# Patient Record
Sex: MALE | Race: BLACK OR AFRICAN AMERICAN | NOT HISPANIC OR LATINO | Employment: UNEMPLOYED | URBAN - METROPOLITAN AREA
[De-identification: names, ages, dates, MRNs, and addresses within clinical notes are randomized per-mention and may not be internally consistent; named-entity substitution may affect disease eponyms.]

---

## 2022-11-23 ENCOUNTER — HOSPITAL ENCOUNTER (EMERGENCY)
Facility: CLINIC | Age: 42
Discharge: HOME | End: 2022-11-23
Attending: NURSE PRACTITIONER | Admitting: NURSE PRACTITIONER
Payer: MEDICAID

## 2022-11-23 ENCOUNTER — APPOINTMENT (OUTPATIENT)
Dept: GENERAL RADIOLOGY | Facility: CLINIC | Age: 42
End: 2022-11-23
Attending: EMERGENCY MEDICINE
Payer: MEDICAID

## 2022-11-23 VITALS
OXYGEN SATURATION: 100 % | DIASTOLIC BLOOD PRESSURE: 104 MMHG | SYSTOLIC BLOOD PRESSURE: 181 MMHG | HEART RATE: 64 BPM | BODY MASS INDEX: 34.82 KG/M2 | TEMPERATURE: 97.5 F | HEIGHT: 65 IN | WEIGHT: 209 LBS | RESPIRATION RATE: 18 BRPM

## 2022-11-23 DIAGNOSIS — I10 HIGH BLOOD PRESSURE: ICD-10-CM

## 2022-11-23 DIAGNOSIS — S82.62XA: ICD-10-CM

## 2022-11-23 PROCEDURE — 27786 TREATMENT OF ANKLE FRACTURE: CPT | Mod: LT

## 2022-11-23 PROCEDURE — 73630 X-RAY EXAM OF FOOT: CPT | Mod: LT

## 2022-11-23 PROCEDURE — 73610 X-RAY EXAM OF ANKLE: CPT | Mod: LT

## 2022-11-23 PROCEDURE — 99284 EMERGENCY DEPT VISIT MOD MDM: CPT | Mod: 25

## 2022-11-23 ASSESSMENT — ENCOUNTER SYMPTOMS
CONFUSION: 0
WOUND: 0
FEVER: 0
JOINT SWELLING: 1
SHORTNESS OF BREATH: 0
ARTHRALGIAS: 1
NAUSEA: 0
NECK PAIN: 0

## 2022-11-23 ASSESSMENT — ACTIVITIES OF DAILY LIVING (ADL): ADLS_ACUITY_SCORE: 33

## 2022-11-23 NOTE — ED TRIAGE NOTES
Monday morning at 0100, pt dropped a large battery on L ankle, pt reports not being able to bear weight since then d/t pain. CMS intact.     Triage Assessment     Row Name 11/23/22 9871       Triage Assessment (Adult)    Airway WDL WDL       Respiratory WDL    Respiratory WDL WDL       Skin Circulation/Temperature WDL    Skin Circulation/Temperature WDL WDL       Cardiac WDL    Cardiac WDL WDL       Peripheral/Neurovascular WDL    Peripheral Neurovascular WDL WDL       Cognitive/Neuro/Behavioral WDL    Cognitive/Neuro/Behavioral WDL WDL

## 2022-11-23 NOTE — LETTER
November 23, 2022      To Whom It May Concern:      Sanjiv Gutierres was seen in our Emergency Department today, 11/23/22.  Sanjiv will need to wear the ankle splint and use crutches and be non-weight bearing until seen for follow-up with orthopedics the week of 11/28/22.    He may return to work when improved.    Sincerely,        Tasha Banda, CNP

## 2022-11-24 NOTE — ED PROVIDER NOTES
ED ATTENDING PHYSICIAN NOTE:   I evaluated this patient in conjunction with Tasha Banda, NIVIA  I have participated in the care of the patient and personally performed key elements of the history, exam, and medical decision making.      HPI:   Sanjiv Gutierres is a 42 year old male left ankle pain.       EXAM:  Physical Exam   General:  Sitting on bed by self, talking on phone with no one at bedside, comfortable appearing.   HENT:  No obvious trauma to head  Right Ear:  External ear normal.   Left Ear:  External ear normal.   Nose:  Nose normal.   Eyes:  Conjunctivae and EOM are normal.  Neck: Normal range of motion. Neck supple. No tracheal deviation present.   Pulm/Chest: No respiratory distress  M/S: Normal range of motion. Mild tenderness to left lateral malleoli with diffuse surrounding swelling, but compartments soft.  No pain to proximal fibula.  DP pulses +2.  Neuro: Alert. GCS 15.  Skin: Skin is warm and dry. No rash noted. Not diaphoretic.   Psych: Normal mood and affect. Behavior is normal.      MEDICAL DECISION MAKING/ASSESSMENT AND PLAN:   Sanjiv Gutierres is a very pleasant 42 year old year old patient who presents to the emergency department with concern of left ankle pain.  X-ray confirms a fracture.  He is placed in a walking boot and given crutches and recommended for nonweightbearing and follow-up with orthopedics.  Compartments are soft.  Please see Ms. Banda's note for additional information.       DIAGNOSIS:     ICD-10-CM    1. Closed fracture of fibula, lateral malleolus, left  S82.62XA     Nondisplaced transverse fracture of the left lateral malleolus (at the level of the talar dome).       2. High blood pressure  I10         DISPOSITION:   Discharged       11/23/2022  [facility] EMERGENCY DEPT     Tye Mcgarry, DO  11/23/22 1926

## 2022-11-24 NOTE — ED PROVIDER NOTES
"  History     Chief Complaint:  Ankle Pain       HPI   Sanjiv Gutierres is a 42 year old male who presents with left ankle pain.  The patient was at work 2 days ago on 11/21/2020 when he battery fell onto the lateral side of his left ankle.  He had acute onset of left lateral ankle pain and has had pain with ambulation since that time.  He has not been evaluated yet.  He denies numbness or weakness of the lower extremity.  He has been taking ibuprofen and Tylenol for pain.  He denies other injury.    ROS:  Review of Systems   Constitutional: Negative for fever.   HENT: Negative.    Respiratory: Negative for shortness of breath.    Cardiovascular: Negative for chest pain.   Gastrointestinal: Negative for nausea.   Musculoskeletal: Positive for arthralgias and joint swelling. Negative for neck pain.   Skin: Negative for wound.   Neurological: Negative for syncope.   Psychiatric/Behavioral: Negative for confusion.   All other systems reviewed and are negative.     Allergies:  No Known Allergies     Medications:    No current outpatient medications on file.      Past Medical History:    No past medical history on file.    Past Surgical History:    No past surgical history on file.     Family History:    family history is not on file.    Social History:  PCP: No primary care provider on file.     Physical Exam     Patient Vitals for the past 24 hrs:   BP Temp Temp src Pulse Resp SpO2 Height Weight   11/23/22 1912 (!) 181/104 -- -- -- -- -- -- --   11/23/22 1705 (!) 190/118 97.5  F (36.4  C) Temporal 64 18 100 % 1.651 m (5' 5\") 94.8 kg (209 lb)        Physical Exam  Nursing notes reviewed. Vitals reviewed.  General: Alert. Moderate discomfort . Well kept.  Eyes: Conjunctiva non-injected, non-icteric.  Neck/Throat: Moist mucous membranes. Normal voice.  Cardiac: Regular rhythm. Normal heart sounds. 2+DP pulse left . Normal capillary refill.  Pulmonary: Clear and equal breath sounds bilaterally.   Musculoskeletal: "   left lower extremity: No foot deformity. No tenderness to palpation over dorsum of foot. Able to flex and extend toes. Tenderness to palpation over bilateral malleolus with swelling. No pain over the proximal tibia/fibula, deltoid ligament, Lis-Franc joint or 5th metatarsal. Full knee flexion and extension intact without difficulty. No knee tenderness to palpation.   Neurological: Alert and oriented x4. 5/5 strength bilateral lower extremity. Distal sensation intact.  Skin: No laceration or ecchymosis to affected extremity.   Psych: Affect normal. Good eye contact.    Emergency Department Course   Imaging:  Foot XR, G/E 3 views, left   Final Result   IMPRESSION:   1.  Nondisplaced transverse fracture of the left lateral malleolus (at the level of the talar dome).   2.  Normal ankle joint spacing and alignment.   3.  Mild hallux valgus and bunion deformity.   4.  Soft tissue swelling about the ankle.      XR Ankle Left G/E 3 Views   Final Result   IMPRESSION:   1.  Nondisplaced transverse fracture of the left lateral malleolus (at the level of the talar dome).   2.  Normal ankle joint spacing and alignment.   3.  Mild hallux valgus and bunion deformity.   4.  Soft tissue swelling about the ankle.       Report per radiology    Emergency Department Course:  Reviewed:  I reviewed nursing notes, vitals, past medical history and Care Everywhere    Assessments:  1848 I obtained history and examined the patient as noted above.     Consults:   1903 I discussed the patient in shared service with Dr. Christiano Mcgarry.    Disposition:  The patient was discharged to home.     Impression & Plan    Medical Decision Making:  Sanjiv Gutierres is a 42 year old male who presents with left ankle pain.  X-ray reveals a nondisplaced transverse fracture of the left lateral malleolus at the level of the talar dome which does not need reduction at this time. Patient was placed in a CAM walking boot splint, and neurovascular status is  normal. No pain with ROM of the knee or ankle or proximal tibia pain.  No other injury noted on exam and patient denies other trauma.   Plan is for non- weightbearing, Follow-up with orthopedics in 3-5 days as discussed. Patient taught to use crutches and will remain non-weight bearing until seen for follow-up with orthopedics. Return sooner for increased pain, swelling, sensory change or other new symptoms.  Was also noted to be hypertensive.  Blood pressure did improve while in the ED without treatment but he continues to have diastolic 104.  Patient will follow-up with primary care for recheck.    Diagnosis:    ICD-10-CM    1. Closed fracture of fibula, lateral malleolus, left  S82.62XA     Nondisplaced transverse fracture of the left lateral malleolus (at the level of the talar dome).       2. High blood pressure  I10            Discharge Medications:  There are no discharge medications for this patient.       11/23/2022   Tasha Banda, Tasha Rain, CNP  11/24/22 0019

## 2023-03-16 ENCOUNTER — TELEPHONE (OUTPATIENT)
Dept: DERMATOLOGY | Facility: CLINIC | Age: 43
End: 2023-03-16
Payer: COMMERCIAL

## 2023-03-16 NOTE — TELEPHONE ENCOUNTER
Called Steffany and scheduled appt.    Thank you,  Domitila OZUNA RN  Dermatology   [phone]

## 2023-03-16 NOTE — TELEPHONE ENCOUNTER
[facility] Call Center    Phone Message    May a detailed message be left on voicemail: yes     Reason for Call: Other: Patient's girlfriend Cherie states patient was in urgent care last night for hives and was recommended to see a dermatologist ASAP if possible. She is hoping patient can be seen today at [facility]. Writer adv her that appt would be requested but may not be able to do. Please call back Steffany at [phone]     Action Taken: Other: [facility]    Travel Screening: Not Applicable

## 2024-03-01 ENCOUNTER — HOSPITAL ENCOUNTER (EMERGENCY)
Facility: CLINIC | Age: 44
Discharge: HOME | End: 2024-03-01
Attending: EMERGENCY MEDICINE | Admitting: EMERGENCY MEDICINE
Payer: COMMERCIAL

## 2024-03-01 VITALS
WEIGHT: 182 LBS | SYSTOLIC BLOOD PRESSURE: 156 MMHG | RESPIRATION RATE: 16 BRPM | TEMPERATURE: 97.6 F | OXYGEN SATURATION: 95 % | DIASTOLIC BLOOD PRESSURE: 114 MMHG | HEART RATE: 72 BPM | HEIGHT: 64 IN | BODY MASS INDEX: 31.07 KG/M2

## 2024-03-01 DIAGNOSIS — I10 ASYMPTOMATIC HYPERTENSION: ICD-10-CM

## 2024-03-01 LAB
ALBUMIN SERPL BCG-MCNC: 4.5 G/DL (ref 3.5–5.2)
ALP SERPL-CCNC: 117 U/L (ref 40–150)
ALT SERPL W P-5'-P-CCNC: 28 U/L (ref 0–70)
ANION GAP SERPL CALCULATED.3IONS-SCNC: 13 MMOL/L (ref 7–15)
AST SERPL W P-5'-P-CCNC: 31 U/L (ref 0–45)
BASOPHILS # BLD AUTO: 0 10E3/UL (ref 0–0.2)
BASOPHILS NFR BLD AUTO: 0 %
BILIRUB SERPL-MCNC: 0.8 MG/DL
BUN SERPL-MCNC: 12.6 MG/DL (ref 6–20)
CALCIUM SERPL-MCNC: 9.1 MG/DL (ref 8.6–10)
CHLORIDE SERPL-SCNC: 99 MMOL/L (ref 98–107)
CREAT SERPL-MCNC: 1.1 MG/DL (ref 0.67–1.17)
DEPRECATED HCO3 PLAS-SCNC: 23 MMOL/L (ref 22–29)
EGFRCR SERPLBLD CKD-EPI 2021: 85 ML/MIN/1.73M2
EOSINOPHIL # BLD AUTO: 0.1 10E3/UL (ref 0–0.7)
EOSINOPHIL NFR BLD AUTO: 2 %
ERYTHROCYTE [DISTWIDTH] IN BLOOD BY AUTOMATED COUNT: 13.9 % (ref 10–15)
GLUCOSE SERPL-MCNC: 88 MG/DL (ref 70–99)
HCT VFR BLD AUTO: 50.5 % (ref 40–53)
HGB BLD-MCNC: 17 G/DL (ref 13.3–17.7)
HOLD SPECIMEN: NORMAL
HOLD SPECIMEN: NORMAL
IMM GRANULOCYTES # BLD: 0 10E3/UL
IMM GRANULOCYTES NFR BLD: 0 %
LYMPHOCYTES # BLD AUTO: 2.9 10E3/UL (ref 0.8–5.3)
LYMPHOCYTES NFR BLD AUTO: 53 %
MCH RBC QN AUTO: 30.3 PG (ref 26.5–33)
MCHC RBC AUTO-ENTMCNC: 33.7 G/DL (ref 31.5–36.5)
MCV RBC AUTO: 90 FL (ref 78–100)
MONOCYTES # BLD AUTO: 0.5 10E3/UL (ref 0–1.3)
MONOCYTES NFR BLD AUTO: 10 %
NEUTROPHILS # BLD AUTO: 1.9 10E3/UL (ref 1.6–8.3)
NEUTROPHILS NFR BLD AUTO: 35 %
NRBC # BLD AUTO: 0 10E3/UL
NRBC BLD AUTO-RTO: 0 /100
PLATELET # BLD AUTO: 257 10E3/UL (ref 150–450)
POTASSIUM SERPL-SCNC: 4 MMOL/L (ref 3.4–5.3)
PROT SERPL-MCNC: 7.9 G/DL (ref 6.4–8.3)
RBC # BLD AUTO: 5.61 10E6/UL (ref 4.4–5.9)
SODIUM SERPL-SCNC: 135 MMOL/L (ref 135–145)
TROPONIN T SERPL HS-MCNC: 14 NG/L
WBC # BLD AUTO: 5.4 10E3/UL (ref 4–11)

## 2024-03-01 PROCEDURE — 36415 COLL VENOUS BLD VENIPUNCTURE: CPT | Performed by: EMERGENCY MEDICINE

## 2024-03-01 PROCEDURE — 85025 COMPLETE CBC W/AUTO DIFF WBC: CPT | Performed by: EMERGENCY MEDICINE

## 2024-03-01 PROCEDURE — 84484 ASSAY OF TROPONIN QUANT: CPT | Performed by: EMERGENCY MEDICINE

## 2024-03-01 PROCEDURE — 99284 EMERGENCY DEPT VISIT MOD MDM: CPT

## 2024-03-01 PROCEDURE — 93005 ELECTROCARDIOGRAM TRACING: CPT

## 2024-03-01 PROCEDURE — 80053 COMPREHEN METABOLIC PANEL: CPT | Performed by: EMERGENCY MEDICINE

## 2024-03-01 ASSESSMENT — ACTIVITIES OF DAILY LIVING (ADL)
ADLS_ACUITY_SCORE: 35

## 2024-03-01 ASSESSMENT — COLUMBIA-SUICIDE SEVERITY RATING SCALE - C-SSRS
2. HAVE YOU ACTUALLY HAD ANY THOUGHTS OF KILLING YOURSELF IN THE PAST MONTH?: NO
1. IN THE PAST MONTH, HAVE YOU WISHED YOU WERE DEAD OR WISHED YOU COULD GO TO SLEEP AND NOT WAKE UP?: NO
6. HAVE YOU EVER DONE ANYTHING, STARTED TO DO ANYTHING, OR PREPARED TO DO ANYTHING TO END YOUR LIFE?: NO

## 2024-03-01 NOTE — ED PROVIDER NOTES
"  History     Chief Complaint:  Hypertension       HPI   Sanjiv Gutierres is a 43 year old male who presents for evaluation of hypertension.  He had a routine physical appointment today at primary care and they noted his blood pressure to be elevated and have an abnormal EKG.  He has separately no symptoms.  He denies any chest pain, shortness of breath, headache, vision changes, neurologic changes such as numbness or weakness or other findings.  He has been prescribed a blood pressure medication but states he stopped taking a number of months ago and was not told to stop taking it.  He appears to receive clonidine in the clinic as well as a new prescription for antihypertensives and recommended come to the ER.  Here he has no complaints and states he has had no complaints prior to arrival.      Independent Historian:   None - Patient Only    Review of External Notes:   Clinic appointment dated today where he was given clonidine for high blood pressure and sent to emergency department.      Medications:    No current outpatient medications on file.      Past Medical History:    No past medical history on file.    Past Surgical History:    No past surgical history on file.     Physical Exam   Patient Vitals for the past 24 hrs:   BP Temp Temp src Pulse Resp SpO2 Height Weight   03/01/24 1713 -- -- -- -- -- -- 1.626 m (5' 4\") 82.6 kg (182 lb)   03/01/24 1700 (!) 178/113 -- -- -- -- -- -- --   03/01/24 1602 (!) 194/110 97.6  F (36.4  C) Temporal 72 16 100 % -- --        Physical Exam  Constitutional: Well appearing.  HEENT: Atraumatic.  PERRL.  EOMI.  Moist mucous membranes.  Neck: Soft.  Supple.  No JVD.  Cardiac: Regular rate and rhythm.  No murmur or rub.  Respiratory: Clear to auscultation bilaterally.  No respiratory distress.  No wheezing, rhonchi, or rales.  Abdomen: Soft and nontender.  No  guarding.  Nondistended.  Musculoskeletal: No edema.  Normal range of motion.  Neurologic: Alert and oriented x3.  " Normal tone and bulk.  No facial drooping.  Normal speech.  5/5 strength in bilateral upper and lower extremities.  Sensation to light touch intact throughout.  Normal gait.  Skin: No rashes.  No edema.  Psych: Normal affect.  Normal behavior.            Emergency Department Course     ECG results from 03/01/24   EKG 12 lead     Value    Systolic Blood Pressure     Diastolic Blood Pressure     Ventricular Rate 65    Atrial Rate 65    HI Interval 152    QRS Duration 86        QTc 430    P Axis 24    R AXIS -3    T Axis 34    Interpretation ECG      Sinus rhythm  Minimal voltage criteria for LVH, may be normal variant ( R in aVL )  Borderline ECG  No previous ECGs available  Confirmed by GENERATED REPORT, COMPUTER ([id number]),  Prema Chaidez ([id number]) on 3/2/2024 11:27:14 PM           Imaging:  No orders to display          Laboratory:  Labs Ordered and Resulted from Time of ED Arrival to Time of ED Departure   COMPREHENSIVE METABOLIC PANEL - Normal       Result Value    Sodium 135      Potassium 4.0      Carbon Dioxide (CO2) 23      Anion Gap 13      Urea Nitrogen 12.6      Creatinine 1.10      GFR Estimate 85      Calcium 9.1      Chloride 99      Glucose 88      Alkaline Phosphatase 117      AST 31      ALT 28      Protein Total 7.9      Albumin 4.5      Bilirubin Total 0.8     TROPONIN T, HIGH SENSITIVITY - Normal    Troponin T, High Sensitivity 14     CBC WITH PLATELETS AND DIFFERENTIAL    WBC Count 5.4      RBC Count 5.61      Hemoglobin 17.0      Hematocrit 50.5      MCV 90      MCH 30.3      MCHC 33.7      RDW 13.9      Platelet Count 257      % Neutrophils 35      % Lymphocytes 53      % Monocytes 10      % Eosinophils 2      % Basophils 0      % Immature Granulocytes 0      NRBCs per 100 WBC 0      Absolute Neutrophils 1.9      Absolute Lymphocytes 2.9      Absolute Monocytes 0.5      Absolute Eosinophils 0.1      Absolute Basophils 0.0      Absolute Immature Granulocytes 0.0      Absolute NRBCs 0.0           Procedures       Emergency Department Course & Assessments:    Interventions:  Medications - No data to display       Disposition:  The patient was discharged.     Impression & Plan      Medical Decision Making:  Sanjiv Gutierres is a 43-year-old man who is afebrile and mildly hypertensive.  His workup as noted as above.  He has no acute EKG changes and no chest pain and troponin is within normal limits making ACS exceedingly unlikely and excluded.  He has no endorgan ischemia and I think an safely discharge home his blood pressure is now down to 142/120.  He is in agreement and feels comfortable this plan.  His primary care physician prescribed new blood pressure medications and he will go to pharmacy and pick that up.  I see no indication for hospitalization as he is no evidence of hypertension urgency or emergency.  He is in agreement feels very comfortable discharge home.  Discussed supportive of care at home and strict return precautions were given.  His questions were answered and he was in no distress at time of discharge.      Diagnosis:    ICD-10-CM    1. Asymptomatic hypertension  I10            Discharge Medications:  New Prescriptions    No medications on file        3/1/2024   Albert Gao MD Salay, Nicholas J, MD  03/04/24 1037

## 2024-03-01 NOTE — ED TRIAGE NOTES
Pt referred in by PCP due to hypertension discovered during routine wellness check     Triage Assessment (Adult)       Row Name 03/01/24 3414          Triage Assessment    Airway WDL WDL        Respiratory WDL    Respiratory WDL WDL        Skin Circulation/Temperature WDL    Skin Circulation/Temperature WDL WDL        Cardiac WDL    Cardiac WDL WDL        Peripheral/Neurovascular WDL    Peripheral Neurovascular WDL WDL        Cognitive/Neuro/Behavioral WDL    Cognitive/Neuro/Behavioral WDL WDL

## 2024-03-02 LAB
ATRIAL RATE - MUSE: 65 BPM
DIASTOLIC BLOOD PRESSURE - MUSE: NORMAL MMHG
INTERPRETATION ECG - MUSE: NORMAL
P AXIS - MUSE: 24 DEGREES
PR INTERVAL - MUSE: 152 MS
QRS DURATION - MUSE: 86 MS
QT - MUSE: 414 MS
QTC - MUSE: 430 MS
R AXIS - MUSE: -3 DEGREES
SYSTOLIC BLOOD PRESSURE - MUSE: NORMAL MMHG
T AXIS - MUSE: 34 DEGREES
VENTRICULAR RATE- MUSE: 65 BPM

## 2024-05-12 ENCOUNTER — HOSPITAL ENCOUNTER (EMERGENCY)
Facility: CLINIC | Age: 44
Discharge: HOME | End: 2024-05-12
Attending: EMERGENCY MEDICINE | Admitting: EMERGENCY MEDICINE
Payer: COMMERCIAL

## 2024-05-12 VITALS
BODY MASS INDEX: 25.76 KG/M2 | WEIGHT: 140 LBS | OXYGEN SATURATION: 97 % | DIASTOLIC BLOOD PRESSURE: 76 MMHG | TEMPERATURE: 98.4 F | RESPIRATION RATE: 26 BRPM | HEIGHT: 62 IN | HEART RATE: 65 BPM | SYSTOLIC BLOOD PRESSURE: 115 MMHG

## 2024-05-12 DIAGNOSIS — T78.3XXA ANGIOEDEMA, INITIAL ENCOUNTER: ICD-10-CM

## 2024-05-12 PROCEDURE — 99284 EMERGENCY DEPT VISIT MOD MDM: CPT | Mod: 25

## 2024-05-12 PROCEDURE — 96374 THER/PROPH/DIAG INJ IV PUSH: CPT

## 2024-05-12 PROCEDURE — 96375 TX/PRO/DX INJ NEW DRUG ADDON: CPT

## 2024-05-12 PROCEDURE — 250N000011 HC RX IP 250 OP 636: Performed by: EMERGENCY MEDICINE

## 2024-05-12 RX ORDER — HYDROCHLOROTHIAZIDE 25 MG/1
25 TABLET ORAL DAILY
Qty: 14 TABLET | Refills: 0 | Status: SHIPPED | OUTPATIENT
Start: 2024-05-12 | End: 2024-05-26

## 2024-05-12 RX ORDER — LISINOPRIL/HYDROCHLOROTHIAZIDE 10-12.5 MG
1 TABLET ORAL DAILY
COMMUNITY

## 2024-05-12 RX ORDER — AMLODIPINE BESYLATE 5 MG/1
5 TABLET ORAL DAILY
COMMUNITY
Start: 2024-03-21

## 2024-05-12 RX ORDER — DIPHENHYDRAMINE HYDROCHLORIDE 50 MG/ML
50 INJECTION INTRAMUSCULAR; INTRAVENOUS ONCE
Status: COMPLETED | OUTPATIENT
Start: 2024-05-12 | End: 2024-05-12

## 2024-05-12 RX ORDER — ATORVASTATIN CALCIUM 20 MG/1
20 TABLET, FILM COATED ORAL DAILY
COMMUNITY
Start: 2023-03-30

## 2024-05-12 RX ORDER — METHYLPREDNISOLONE SODIUM SUCCINATE 125 MG/2ML
125 INJECTION, POWDER, LYOPHILIZED, FOR SOLUTION INTRAMUSCULAR; INTRAVENOUS ONCE
Status: COMPLETED | OUTPATIENT
Start: 2024-05-12 | End: 2024-05-12

## 2024-05-12 RX ADMIN — FAMOTIDINE 20 MG: 10 INJECTION, SOLUTION INTRAVENOUS at 04:53

## 2024-05-12 RX ADMIN — METHYLPREDNISOLONE SODIUM SUCCINATE 125 MG: 125 INJECTION, POWDER, FOR SOLUTION INTRAMUSCULAR; INTRAVENOUS at 04:51

## 2024-05-12 ASSESSMENT — ACTIVITIES OF DAILY LIVING (ADL)
ADLS_ACUITY_SCORE: 35

## 2024-05-12 ASSESSMENT — COLUMBIA-SUICIDE SEVERITY RATING SCALE - C-SSRS
2. HAVE YOU ACTUALLY HAD ANY THOUGHTS OF KILLING YOURSELF IN THE PAST MONTH?: NO
6. HAVE YOU EVER DONE ANYTHING, STARTED TO DO ANYTHING, OR PREPARED TO DO ANYTHING TO END YOUR LIFE?: NO
1. IN THE PAST MONTH, HAVE YOU WISHED YOU WERE DEAD OR WISHED YOU COULD GO TO SLEEP AND NOT WAKE UP?: NO

## 2024-05-12 NOTE — DISCHARGE INSTRUCTIONS
Stop the combination medication that you are currently on which is lisinopril-hydrochlorothiazide  Continue amlodipine and I prescribed hydrochlorothiazide to you which does not have the lisinopril in it  Follow-up with primary care provider for recheck of blood pressure and refills of hydrochlorothiazide.  Increase the hydrochlorothiazide since we are stopping the lisinopril  You should not take lisinopril again.  If you start to notice tongue swelling or difficulty breathing or throat swelling, you should be seen again  This swelling should improve over time

## 2024-05-12 NOTE — ED TRIAGE NOTES
Patient comes from home via EMS for oral swelling starting at midnight. Patient took benadryl at home around 0400. EMS gave 0.5 epi IM in route. Denies other symptoms, airway intact. Patient taking lisinopril.     Triage Assessment (Adult)       Row Name 05/12/24 0448          Triage Assessment    Airway WDL X  oral swelling        Respiratory WDL    Respiratory WDL WDL        Cardiac WDL    Cardiac WDL WDL        Peripheral/Neurovascular WDL    Peripheral Neurovascular WDL WDL        Cognitive/Neuro/Behavioral WDL    Cognitive/Neuro/Behavioral WDL WDL

## 2024-05-12 NOTE — ED NOTES
Bed: ED11  Expected date:   Expected time:   Means of arrival:   Comments:  Julian 545 43 M allergic reaction unk what swelling to face and lips no airway inclusion eta 0448

## 2024-05-12 NOTE — ED PROVIDER NOTES
"  Emergency Department Note      History of Present Illness     Chief Complaint  Oral Swelling    HPI  Sanjiv Gutierres is a 43 year old male who presented to the emergency department with swelling.  Patient noticed sudden onset of upper lip swelling and cheek swelling a few hours ago.  No difficulty breathing, throat swelling, rash or tongue swelling.  No vomiting.  Is on lisinopril.  No new medications or something that would have precipitated this tonight.    Independent Historian  None    Review of External Notes  Reviewed March 2024 emergency department visit where he was seen for asymptomatic hypertension and started on medication  Past Medical History   Medical History and Problem List  Hypertension    Medications  amLODIPine (NORVASC) 5 MG tablet  atorvastatin (LIPITOR) 20 MG tablet  lisinopril-hydrochlorothiazide (ZESTORETIC) 10-12.5 MG tablet      Physical Exam   Patient Vitals for the past 24 hrs:   BP Temp Temp src Pulse Resp SpO2 Height Weight   05/12/24 0455 115/76 -- -- 70 23 95 % -- --   05/12/24 0446 124/65 98.4  F (36.9  C) Oral 65 24 97 % 1.575 m (5' 2\") 63.5 kg (140 lb)     Physical Exam  General: Sitting up in bed  Eyes:  The pupils are equal and round    Conjunctivae and sclerae are normal  ENT:    Upper lip swelling, mild swelling on right side of face.  No tongue swelling.  Posterior oropharynx without swelling.  No dental infection or erythema on the face  Neck:  Normal range of motion  CV:  Regular rate,  regular rhythm     Skin warm and well perfused   Resp:  Non labored breathing on room air    No tachypnea    No cough heard    Lungs clear bilaterally  GI:  Abdomen is soft, there is no rigidity    No distension    No rebound tenderness     No abdominal tenderness  MS:  Normal muscular tone  Skin:  No rash or acute skin lesions noted  Neuro:   Awake, alert.      Speech is normal and fluent.    Face is symmetric.     Moves all extremities equally  Psych: Normal affect.  Appropriate " interactions.    ED Course    Medications Administered  Medications   methylPREDNISolone sodium succinate (solu-MEDROL) injection 125 mg (125 mg Intravenous $Given 5/12/24 0451)   diphenhydrAMINE (BENADRYL) injection 50 mg (50 mg Intravenous Not Given 5/12/24 0704)   famotidine (PEPCID) injection 20 mg (20 mg Intravenous $Given 5/12/24 0323)     Procedures  Procedures     Discussion of Management  None    Social Determinants of Health adding to complexity of care  None    ED Course  Past medical records, nursing notes, and vitals reviewed.    Medical Decision Making / Diagnosis     MDM  Sanjiv Gutierres is a 43 year old male who presented to the emergency department with lip swelling.  Swelling is most prominent on upper lip.  Also mild swelling and right side of face.  This started suddenly.  There is no precipitating factors.  There is no oral swelling.  No difficulty breathing.  He did receive epinephrine by EMS but this did not seem to significantly improve the swelling.  He does feel like overall it is slightly better than earlier.  He is on lisinopril and I do think that this is the cause of his swelling.  I discussed that he should stop taking the combination blood pressure medication which includes lisinopril and hydrochlorothiazide and I prescribed hydrochlorothiazide by itself.  I also increased the dose of hydrochlorothiazide given that we are stopping the lisinopril.  Recommended that he follow-up with primary care provider for recheck of the blood pressure.  He remained in the emergency department for over 4 hours and no worsening of symptoms.  Vital signs without hypoxia and no increase in work of breathing.  Given no intraoral swelling, do not think further monitoring is indicated at this time but discussed that he should return to the emergency department if develops difficulty breathing or intraoral swelling.  This does not seem consistent with anaphylaxis.    Disposition  The patient was  discharged.     ICD-10 Codes:    ICD-10-CM    1. Angioedema, initial encounter  T78.3XXA            Discharge Medications  New Prescriptions    HYDROCHLOROTHIAZIDE (HYDRODIURIL) 25 MG TABLET    Take 1 tablet (25 mg) by mouth daily for 14 days       Samaria Miguel MD    [employer]        Samaria Miguel MD  05/12/24 0888